# Patient Record
Sex: MALE | Race: ASIAN | NOT HISPANIC OR LATINO | Employment: PART TIME | URBAN - METROPOLITAN AREA
[De-identification: names, ages, dates, MRNs, and addresses within clinical notes are randomized per-mention and may not be internally consistent; named-entity substitution may affect disease eponyms.]

---

## 2017-02-01 PROBLEM — L43.9 LICHEN PLANUS: Status: ACTIVE | Noted: 2017-02-01

## 2020-02-18 ENCOUNTER — TELEPHONE (OUTPATIENT)
Dept: OTOLARYNGOLOGY | Facility: CLINIC | Age: 65
End: 2020-02-18

## 2020-02-18 NOTE — TELEPHONE ENCOUNTER
----- Message from Neela Ricks sent at 2/18/2020  3:26 PM CST -----  Contact: Ramiro (wife) @ [phone] or   callling to speak with someone in ENT prior to scheduling an appt, says patient has a referral to ENT in [facility] for sore throat, but caller says patient has mouth sores that are not healing. Patient previously had prostate CA. Patient is self pay and has been informed of deposit required.  Please call to schedule.

## 2021-05-10 ENCOUNTER — PATIENT MESSAGE (OUTPATIENT)
Dept: RESEARCH | Facility: HOSPITAL | Age: 66
End: 2021-05-10

## 2022-10-03 ENCOUNTER — PATIENT MESSAGE (OUTPATIENT)
Dept: ADMINISTRATIVE | Facility: HOSPITAL | Age: 67
End: 2022-10-03

## 2023-11-06 PROBLEM — G47.33 OBSTRUCTIVE SLEEP APNEA: Status: ACTIVE | Noted: 2023-11-06

## 2023-11-06 PROBLEM — I10 PRIMARY HYPERTENSION: Status: ACTIVE | Noted: 2023-11-06

## 2023-11-06 PROBLEM — R93.1 ELEVATED CORONARY ARTERY CALCIUM SCORE: Status: ACTIVE | Noted: 2023-11-06

## 2023-11-06 PROBLEM — R00.2 PALPITATIONS: Status: ACTIVE | Noted: 2023-11-06

## 2023-12-12 PROBLEM — R06.09 DOE (DYSPNEA ON EXERTION): Status: ACTIVE | Noted: 2023-12-12

## 2023-12-19 PROBLEM — I25.10 MILD CAD: Status: ACTIVE | Noted: 2023-12-19
